# Patient Record
Sex: FEMALE | Race: BLACK OR AFRICAN AMERICAN | Employment: OTHER | URBAN - METROPOLITAN AREA
[De-identification: names, ages, dates, MRNs, and addresses within clinical notes are randomized per-mention and may not be internally consistent; named-entity substitution may affect disease eponyms.]

---

## 2022-11-16 ENCOUNTER — APPOINTMENT (OUTPATIENT)
Dept: GENERAL RADIOLOGY | Age: 72
DRG: 392 | End: 2022-11-16
Attending: EMERGENCY MEDICINE
Payer: MEDICARE

## 2022-11-16 ENCOUNTER — HOSPITAL ENCOUNTER (INPATIENT)
Age: 72
LOS: 1 days | Discharge: HOME | DRG: 392 | End: 2022-11-17
Attending: EMERGENCY MEDICINE | Admitting: INTERNAL MEDICINE
Payer: MEDICARE

## 2022-11-16 ENCOUNTER — APPOINTMENT (OUTPATIENT)
Dept: CT IMAGING | Age: 72
DRG: 392 | End: 2022-11-16
Attending: EMERGENCY MEDICINE
Payer: MEDICARE

## 2022-11-16 DIAGNOSIS — R65.10 SIRS (SYSTEMIC INFLAMMATORY RESPONSE SYNDROME) (HCC): ICD-10-CM

## 2022-11-16 DIAGNOSIS — R11.10 VOMITING AND DIARRHEA: ICD-10-CM

## 2022-11-16 DIAGNOSIS — T68.XXXA HYPOTHERMIA, INITIAL ENCOUNTER: Primary | ICD-10-CM

## 2022-11-16 DIAGNOSIS — R53.1 GENERALIZED WEAKNESS: ICD-10-CM

## 2022-11-16 DIAGNOSIS — R19.7 VOMITING AND DIARRHEA: ICD-10-CM

## 2022-11-16 PROBLEM — R11.2 NAUSEA & VOMITING: Status: ACTIVE | Noted: 2022-11-16

## 2022-11-16 LAB
ALBUMIN SERPL-MCNC: 3.7 G/DL (ref 3.5–5)
ALBUMIN/GLOB SERPL: 0.8 {RATIO} (ref 1.1–2.2)
ALP SERPL-CCNC: 80 U/L (ref 45–117)
ALT SERPL-CCNC: 28 U/L (ref 12–78)
ANION GAP SERPL CALC-SCNC: 7 MMOL/L (ref 5–15)
APPEARANCE UR: CLEAR
AST SERPL-CCNC: 21 U/L (ref 15–37)
BACTERIA URNS QL MICRO: NEGATIVE /HPF
BASE DEFICIT BLD-SCNC: 2.2 MMOL/L
BASOPHILS # BLD: 0 K/UL (ref 0–0.1)
BASOPHILS NFR BLD: 0 % (ref 0–1)
BILIRUB SERPL-MCNC: 0.2 MG/DL (ref 0.2–1)
BILIRUB UR QL: NEGATIVE
BUN SERPL-MCNC: 19 MG/DL (ref 6–20)
BUN/CREAT SERPL: 18 (ref 12–20)
CA-I BLD-MCNC: 1.24 MMOL/L (ref 1.12–1.32)
CALCIUM SERPL-MCNC: 9.7 MG/DL (ref 8.5–10.1)
CHLORIDE BLD-SCNC: 111 MMOL/L (ref 100–108)
CHLORIDE SERPL-SCNC: 110 MMOL/L (ref 97–108)
CO2 BLD-SCNC: 25 MMOL/L (ref 19–24)
CO2 SERPL-SCNC: 24 MMOL/L (ref 21–32)
COLOR UR: NORMAL
COVID-19 RAPID TEST, COVR: NOT DETECTED
CREAT SERPL-MCNC: 1.03 MG/DL (ref 0.55–1.02)
CREAT UR-MCNC: 0.8 MG/DL (ref 0.6–1.3)
DIFFERENTIAL METHOD BLD: ABNORMAL
EOSINOPHIL # BLD: 0 K/UL (ref 0–0.4)
EOSINOPHIL NFR BLD: 0 % (ref 0–7)
EPITH CASTS URNS QL MICRO: NORMAL /LPF
ERYTHROCYTE [DISTWIDTH] IN BLOOD BY AUTOMATED COUNT: 13.1 % (ref 11.5–14.5)
GLOBULIN SER CALC-MCNC: 4.6 G/DL (ref 2–4)
GLUCOSE BLD STRIP.AUTO-MCNC: 170 MG/DL (ref 74–106)
GLUCOSE SERPL-MCNC: 162 MG/DL (ref 65–100)
GLUCOSE UR STRIP.AUTO-MCNC: NEGATIVE MG/DL
HCO3 BLDA-SCNC: 25 MMOL/L
HCT VFR BLD AUTO: 43.9 % (ref 35–47)
HGB BLD-MCNC: 13.9 G/DL (ref 11.5–16)
HGB UR QL STRIP: NEGATIVE
HYALINE CASTS URNS QL MICRO: NORMAL /LPF (ref 0–2)
IMM GRANULOCYTES # BLD AUTO: 0.1 K/UL (ref 0–0.04)
IMM GRANULOCYTES NFR BLD AUTO: 1 % (ref 0–0.5)
KETONES UR QL STRIP.AUTO: NEGATIVE MG/DL
LACTATE BLD-SCNC: 2.1 MMOL/L (ref 0.4–2)
LEUKOCYTE ESTERASE UR QL STRIP.AUTO: NEGATIVE
LIPASE SERPL-CCNC: 87 U/L (ref 73–393)
LYMPHOCYTES # BLD: 1 K/UL (ref 0.8–3.5)
LYMPHOCYTES NFR BLD: 9 % (ref 12–49)
MAGNESIUM SERPL-MCNC: 2 MG/DL (ref 1.6–2.4)
MCH RBC QN AUTO: 30.8 PG (ref 26–34)
MCHC RBC AUTO-ENTMCNC: 31.7 G/DL (ref 30–36.5)
MCV RBC AUTO: 97.1 FL (ref 80–99)
MONOCYTES # BLD: 0.5 K/UL (ref 0–1)
MONOCYTES NFR BLD: 4 % (ref 5–13)
NEUTS SEG # BLD: 9.6 K/UL (ref 1.8–8)
NEUTS SEG NFR BLD: 86 % (ref 32–75)
NITRITE UR QL STRIP.AUTO: NEGATIVE
NRBC # BLD: 0 K/UL (ref 0–0.01)
NRBC BLD-RTO: 0 PER 100 WBC
PCO2 BLDV: 51.8 MMHG (ref 41–51)
PH BLDV: 7.3 [PH] (ref 7.32–7.42)
PH UR STRIP: 6 [PH] (ref 5–8)
PLATELET # BLD AUTO: 206 K/UL (ref 150–400)
PMV BLD AUTO: 12.3 FL (ref 8.9–12.9)
PO2 BLDV: 29 MMHG (ref 25–40)
POTASSIUM BLD-SCNC: 3.9 MMOL/L (ref 3.5–5.5)
POTASSIUM SERPL-SCNC: 3.8 MMOL/L (ref 3.5–5.1)
PROCALCITONIN SERPL-MCNC: <0.05 NG/ML
PROT SERPL-MCNC: 8.3 G/DL (ref 6.4–8.2)
PROT UR STRIP-MCNC: NEGATIVE MG/DL
RBC # BLD AUTO: 4.52 M/UL (ref 3.8–5.2)
RBC #/AREA URNS HPF: NORMAL /HPF (ref 0–5)
SERVICE CMNT-IMP: ABNORMAL
SODIUM BLD-SCNC: 146 MMOL/L (ref 136–145)
SODIUM SERPL-SCNC: 141 MMOL/L (ref 136–145)
SOURCE, COVRS: NORMAL
SP GR UR REFRACTOMETRY: 1.02 (ref 1–1.03)
SPECIMEN SITE: ABNORMAL
TROPONIN-HIGH SENSITIVITY: 6 NG/L (ref 0–51)
TSH SERPL DL<=0.05 MIU/L-ACNC: 1.31 UIU/ML (ref 0.36–3.74)
UA: UC IF INDICATED,UAUC: NORMAL
UROBILINOGEN UR QL STRIP.AUTO: 0.2 EU/DL (ref 0.2–1)
WBC # BLD AUTO: 11.3 K/UL (ref 3.6–11)
WBC URNS QL MICRO: NORMAL /HPF (ref 0–4)

## 2022-11-16 PROCEDURE — 87040 BLOOD CULTURE FOR BACTERIA: CPT

## 2022-11-16 PROCEDURE — 85025 COMPLETE CBC W/AUTO DIFF WBC: CPT

## 2022-11-16 PROCEDURE — 84484 ASSAY OF TROPONIN QUANT: CPT

## 2022-11-16 PROCEDURE — 93005 ELECTROCARDIOGRAM TRACING: CPT

## 2022-11-16 PROCEDURE — 74011000636 HC RX REV CODE- 636: Performed by: EMERGENCY MEDICINE

## 2022-11-16 PROCEDURE — 74011000258 HC RX REV CODE- 258: Performed by: INTERNAL MEDICINE

## 2022-11-16 PROCEDURE — 87635 SARS-COV-2 COVID-19 AMP PRB: CPT

## 2022-11-16 PROCEDURE — 82947 ASSAY GLUCOSE BLOOD QUANT: CPT

## 2022-11-16 PROCEDURE — 80053 COMPREHEN METABOLIC PANEL: CPT

## 2022-11-16 PROCEDURE — 74011250636 HC RX REV CODE- 250/636: Performed by: EMERGENCY MEDICINE

## 2022-11-16 PROCEDURE — 81001 URINALYSIS AUTO W/SCOPE: CPT

## 2022-11-16 PROCEDURE — 83735 ASSAY OF MAGNESIUM: CPT

## 2022-11-16 PROCEDURE — 71045 X-RAY EXAM CHEST 1 VIEW: CPT

## 2022-11-16 PROCEDURE — 99285 EMERGENCY DEPT VISIT HI MDM: CPT

## 2022-11-16 PROCEDURE — 83690 ASSAY OF LIPASE: CPT

## 2022-11-16 PROCEDURE — 84145 PROCALCITONIN (PCT): CPT

## 2022-11-16 PROCEDURE — 74011250636 HC RX REV CODE- 250/636: Performed by: INTERNAL MEDICINE

## 2022-11-16 PROCEDURE — 84443 ASSAY THYROID STIM HORMONE: CPT

## 2022-11-16 PROCEDURE — 36415 COLL VENOUS BLD VENIPUNCTURE: CPT

## 2022-11-16 PROCEDURE — 74177 CT ABD & PELVIS W/CONTRAST: CPT

## 2022-11-16 PROCEDURE — 96360 HYDRATION IV INFUSION INIT: CPT

## 2022-11-16 PROCEDURE — 65270000029 HC RM PRIVATE

## 2022-11-16 PROCEDURE — 74011000258 HC RX REV CODE- 258: Performed by: EMERGENCY MEDICINE

## 2022-11-16 PROCEDURE — 70450 CT HEAD/BRAIN W/O DYE: CPT

## 2022-11-16 RX ORDER — CLOPIDOGREL BISULFATE 75 MG/1
75 TABLET ORAL DAILY
Status: DISCONTINUED | OUTPATIENT
Start: 2022-11-17 | End: 2022-11-17 | Stop reason: HOSPADM

## 2022-11-16 RX ORDER — ONDANSETRON 4 MG/1
4 TABLET, ORALLY DISINTEGRATING ORAL
Status: DISCONTINUED | OUTPATIENT
Start: 2022-11-16 | End: 2022-11-17 | Stop reason: HOSPADM

## 2022-11-16 RX ORDER — ATORVASTATIN CALCIUM 10 MG/1
20 TABLET, FILM COATED ORAL DAILY
COMMUNITY

## 2022-11-16 RX ORDER — ONDANSETRON 2 MG/ML
4 INJECTION INTRAMUSCULAR; INTRAVENOUS
Status: DISCONTINUED | OUTPATIENT
Start: 2022-11-16 | End: 2022-11-17 | Stop reason: HOSPADM

## 2022-11-16 RX ORDER — ONDANSETRON 2 MG/ML
4 INJECTION INTRAMUSCULAR; INTRAVENOUS
Status: COMPLETED | OUTPATIENT
Start: 2022-11-16 | End: 2022-11-16

## 2022-11-16 RX ORDER — SODIUM CHLORIDE 0.9 % (FLUSH) 0.9 %
5-40 SYRINGE (ML) INJECTION EVERY 8 HOURS
Status: DISCONTINUED | OUTPATIENT
Start: 2022-11-16 | End: 2022-11-17 | Stop reason: HOSPADM

## 2022-11-16 RX ORDER — SODIUM CHLORIDE 0.9 % (FLUSH) 0.9 %
5-40 SYRINGE (ML) INJECTION AS NEEDED
Status: DISCONTINUED | OUTPATIENT
Start: 2022-11-16 | End: 2022-11-17 | Stop reason: HOSPADM

## 2022-11-16 RX ORDER — ACETAMINOPHEN 325 MG/1
650 TABLET ORAL
Status: DISCONTINUED | OUTPATIENT
Start: 2022-11-16 | End: 2022-11-17 | Stop reason: HOSPADM

## 2022-11-16 RX ORDER — ACETAMINOPHEN 650 MG/1
650 SUPPOSITORY RECTAL
Status: DISCONTINUED | OUTPATIENT
Start: 2022-11-16 | End: 2022-11-17 | Stop reason: HOSPADM

## 2022-11-16 RX ORDER — ENOXAPARIN SODIUM 100 MG/ML
40 INJECTION SUBCUTANEOUS DAILY
Status: DISCONTINUED | OUTPATIENT
Start: 2022-11-17 | End: 2022-11-17 | Stop reason: HOSPADM

## 2022-11-16 RX ORDER — POLYETHYLENE GLYCOL 3350 17 G/17G
17 POWDER, FOR SOLUTION ORAL DAILY PRN
Status: DISCONTINUED | OUTPATIENT
Start: 2022-11-16 | End: 2022-11-17 | Stop reason: HOSPADM

## 2022-11-16 RX ORDER — ATORVASTATIN CALCIUM 10 MG/1
20 TABLET, FILM COATED ORAL DAILY
Status: DISCONTINUED | OUTPATIENT
Start: 2022-11-17 | End: 2022-11-17 | Stop reason: HOSPADM

## 2022-11-16 RX ORDER — CLOPIDOGREL BISULFATE 75 MG/1
75 TABLET ORAL DAILY
COMMUNITY

## 2022-11-16 RX ADMIN — ONDANSETRON 4 MG: 2 INJECTION INTRAMUSCULAR; INTRAVENOUS at 22:49

## 2022-11-16 RX ADMIN — PIPERACILLIN AND TAZOBACTAM 3.38 G: 3; .375 INJECTION, POWDER, FOR SOLUTION INTRAVENOUS at 18:37

## 2022-11-16 RX ADMIN — IOPAMIDOL 100 ML: 755 INJECTION, SOLUTION INTRAVENOUS at 13:16

## 2022-11-16 RX ADMIN — SODIUM CHLORIDE 500 ML: 9 INJECTION, SOLUTION INTRAVENOUS at 12:05

## 2022-11-16 RX ADMIN — ONDANSETRON 4 MG: 2 INJECTION INTRAMUSCULAR; INTRAVENOUS at 16:14

## 2022-11-16 RX ADMIN — ONDANSETRON 4 MG: 2 INJECTION INTRAMUSCULAR; INTRAVENOUS at 11:58

## 2022-11-16 RX ADMIN — SODIUM CHLORIDE 1 G: 900 INJECTION INTRAVENOUS at 15:06

## 2022-11-16 NOTE — ED PROVIDER NOTES
EMERGENCY DEPARTMENT HISTORY AND PHYSICAL EXAM      Date: 11/16/2022  Patient Name: Vel Mendoza    History of Presenting Illness     Chief Complaint   Patient presents with    Vomiting     Pt lives with family after previous stroke 2 years ago, pt normally gets care at [place], pt has weakness at baseline but family notes she is particularly weak this morning after having one episode of diarrhea and vomiting, no blood in emesis or stool       History Provided By: Patient and Caregiver    HPI: Vel Mendoza, 71 y.o. female presents to the ED with cc of generalized weakness. Patient has history of a prior stroke, with residual right-sided weakness. She is normally seen at the [place]. Her family member states polina she normally can walk with a walker. She had 1 episode of vomiting diarrhea today, and since then, she has not been able to use her walker. Patient has had a cough for several days which is nonproductive. They deny fever or chills. Patient denies any pain, specifically denies chest pain, abdominal pain. She denies any change in urination. She is vaccinated against COVID-19, and has had a pneumonia vaccine. There are no other complaints, changes, or physical findings at this time. PCP: No primary care provider on file. No current facility-administered medications on file prior to encounter. No current outpatient medications on file prior to encounter. Past History     Past Medical History:  Past Medical History:   Diagnosis Date    CVA (cerebral vascular accident) [facility])        Past Surgical History:  No pertinent past surgical history    Family History:  No family history on file. Social History: Allergies: Allergies   Allergen Reactions    Aspirin Shortness of Breath         Review of Systems   Review of Systems   Constitutional:  Negative for fever. HENT:  Negative for congestion. Eyes: Negative. Respiratory:  Positive for cough.     Cardiovascular:  Negative for chest pain. Gastrointestinal:  Positive for diarrhea, nausea and vomiting. Negative for abdominal pain. Endocrine: Negative for heat intolerance. Genitourinary: Negative. Musculoskeletal:  Negative for back pain. Skin:  Negative for rash. Allergic/Immunologic: Negative for immunocompromised state. Neurological:  Positive for weakness. Hematological:  Does not bruise/bleed easily. Psychiatric/Behavioral: Negative. All other systems reviewed and are negative. Physical Exam   Physical Exam  Vitals and nursing note reviewed. Constitutional:       General: She is not in acute distress. Appearance: She is well-developed. HENT:      Head: Normocephalic. Cardiovascular:      Rate and Rhythm: Normal rate and regular rhythm. Pulses: Normal pulses. Heart sounds: Normal heart sounds. Pulmonary:      Effort: Pulmonary effort is normal.      Breath sounds: Normal breath sounds. Abdominal:      General: Bowel sounds are normal.      Palpations: Abdomen is soft. Tenderness: There is no abdominal tenderness. Musculoskeletal:         General: Normal range of motion. Cervical back: Normal range of motion and neck supple. Skin:     General: Skin is warm and dry. Neurological:      Mental Status: She is alert and oriented to person, place, and time. Comments: Mild baseline right-sided weakness, sensory intact, alert and oriented x3   Psychiatric:         Mood and Affect: Mood normal.         Behavior: Behavior normal.         Thought Content:  Thought content normal.       Diagnostic Study Results     Labs -     Recent Results (from the past 12 hour(s))   EKG, 12 LEAD, INITIAL    Collection Time: 11/16/22 11:08 AM   Result Value Ref Range    Ventricular Rate 59 BPM    Atrial Rate 59 BPM    P-R Interval 150 ms    QRS Duration 70 ms    Q-T Interval 470 ms    QTC Calculation (Bezet) 465 ms    Calculated R Axis -10 degrees    Calculated T Axis 132 degrees    Diagnosis Sinus bradycardia  Nonspecific ST and T wave abnormality  No previous ECGs available     TROPONIN-HIGH SENSITIVITY    Collection Time: 11/16/22 11:39 AM   Result Value Ref Range    Troponin-High Sensitivity 6 0 - 51 ng/L   CBC WITH AUTOMATED DIFF    Collection Time: 11/16/22 11:40 AM   Result Value Ref Range    WBC 11.3 (H) 3.6 - 11.0 K/uL    RBC 4.52 3.80 - 5.20 M/uL    HGB 13.9 11.5 - 16.0 g/dL    HCT 43.9 35.0 - 47.0 %    MCV 97.1 80.0 - 99.0 FL    MCH 30.8 26.0 - 34.0 PG    MCHC 31.7 30.0 - 36.5 g/dL    RDW 13.1 11.5 - 14.5 %    PLATELET 287 754 - 143 K/uL    MPV 12.3 8.9 - 12.9 FL    NRBC 0.0 0  WBC    ABSOLUTE NRBC 0.00 0.00 - 0.01 K/uL    NEUTROPHILS 86 (H) 32 - 75 %    LYMPHOCYTES 9 (L) 12 - 49 %    MONOCYTES 4 (L) 5 - 13 %    EOSINOPHILS 0 0 - 7 %    BASOPHILS 0 0 - 1 %    IMMATURE GRANULOCYTES 1 (H) 0.0 - 0.5 %    ABS. NEUTROPHILS 9.6 (H) 1.8 - 8.0 K/UL    ABS. LYMPHOCYTES 1.0 0.8 - 3.5 K/UL    ABS. MONOCYTES 0.5 0.0 - 1.0 K/UL    ABS. EOSINOPHILS 0.0 0.0 - 0.4 K/UL    ABS. BASOPHILS 0.0 0.0 - 0.1 K/UL    ABS. IMM. GRANS. 0.1 (H) 0.00 - 0.04 K/UL    DF AUTOMATED     METABOLIC PANEL, COMPREHENSIVE    Collection Time: 11/16/22 11:40 AM   Result Value Ref Range    Sodium 141 136 - 145 mmol/L    Potassium 3.8 3.5 - 5.1 mmol/L    Chloride 110 (H) 97 - 108 mmol/L    CO2 24 21 - 32 mmol/L    Anion gap 7 5 - 15 mmol/L    Glucose 162 (H) 65 - 100 mg/dL    BUN 19 6 - 20 MG/DL    Creatinine 1.03 (H) 0.55 - 1.02 MG/DL    BUN/Creatinine ratio 18 12 - 20      eGFR 58 (L) >60 ml/min/1.73m2    Calcium 9.7 8.5 - 10.1 MG/DL    Bilirubin, total 0.2 0.2 - 1.0 MG/DL    ALT (SGPT) 28 12 - 78 U/L    AST (SGOT) 21 15 - 37 U/L    Alk.  phosphatase 80 45 - 117 U/L    Protein, total 8.3 (H) 6.4 - 8.2 g/dL    Albumin 3.7 3.5 - 5.0 g/dL    Globulin 4.6 (H) 2.0 - 4.0 g/dL    A-G Ratio 0.8 (L) 1.1 - 2.2     LIPASE    Collection Time: 11/16/22 11:40 AM   Result Value Ref Range    Lipase 87 73 - 393 U/L   MAGNESIUM    Collection Time: 11/16/22 11:40 AM   Result Value Ref Range    Magnesium 2.0 1.6 - 2.4 mg/dL   BLOOD GAS,CHEM8,LACTIC ACID POC    Collection Time: 11/16/22 11:57 AM   Result Value Ref Range    Calcium, ionized (POC) 1.24 1.12 - 1.32 mmol/L    BICARBONATE 25 mmol/L    Base deficit (POC) 2.2 mmol/L    Sample source VENOUS BLOOD      CO2, POC 25 (H) 19 - 24 MMOL/L    Sodium,  (H) 136 - 145 MMOL/L    Potassium, POC 3.9 3.5 - 5.5 MMOL/L    Chloride,  (H) 100 - 108 MMOL/L    Glucose,  (H) 74 - 106 MG/DL    Creatinine, POC 0.8 0.6 - 1.3 MG/DL    Lactic Acid (POC) 2.10 (HH) 0.40 - 2.00 mmol/L    Critical value read back SMITH     pH, venous (POC) 7.30 (L) 7.32 - 7.42      pCO2, venous (POC) 51.8 (H) 41 - 51 MMHG    pO2, venous (POC) 29 25 - 40 mmHg   COVID-19 RAPID TEST    Collection Time: 11/16/22  2:24 PM   Result Value Ref Range    Specimen source Nasopharyngeal      COVID-19 rapid test Not detected NOTD     URINALYSIS W/ REFLEX CULTURE    Collection Time: 11/16/22  3:01 PM    Specimen: Urine   Result Value Ref Range    Color YELLOW/STRAW      Appearance CLEAR CLEAR      Specific gravity 1.020 1.003 - 1.030      pH (UA) 6.0 5.0 - 8.0      Protein Negative NEG mg/dL    Glucose Negative NEG mg/dL    Ketone Negative NEG mg/dL    Bilirubin Negative NEG      Blood Negative NEG      Urobilinogen 0.2 0.2 - 1.0 EU/dL    Nitrites Negative NEG      Leukocyte Esterase Negative NEG      UA:UC IF INDICATED CULTURE NOT INDICATED BY UA RESULT CNI      WBC 0-4 0 - 4 /hpf    RBC 0-5 0 - 5 /hpf    Epithelial cells FEW FEW /lpf    Bacteria Negative NEG /hpf    Hyaline cast 0-2 0 - 2 /lpf       Radiologic Studies -   CT HEAD WO CONT   Final Result   No acute intracranial process. Small scattered chronic infarcts. Imaging findings consistent with moderate chronic microvascular ischemic change. There is a outer it degree of cerebral atrophy.              CT ABD PELV W CONT   Final Result   No acute findings or findings to correlate with diarrhea and   vomiting. Probable postcholecystectomy related mild biliary ductal dilation. XR CHEST SNGL V   Final Result   Normal chest.        CT Results  (Last 48 hours)      None          CXR Results  (Last 48 hours)      None            Medical Decision Making   I am the first provider for this patient. I reviewed the vital signs, available nursing notes, past medical history, past surgical history, family history and social history. Vital Signs-Reviewed the patient's vital signs. Patient Vitals for the past 12 hrs:   Temp Pulse Resp BP SpO2   11/16/22 1520 96.9 °F (36.1 °C) 73 22 -- 96 %   11/16/22 1456 -- 68 19 132/65 96 %   11/16/22 1426 (!) 94.9 °F (34.9 °C) -- -- -- --   11/16/22 1224 (!) 94.4 °F (34.7 °C) 62 18 133/66 95 %   11/16/22 1054 (!) 92 °F (33.3 °C) 62 18 (!) 144/69 100 %       EKG interpretation: (Preliminary)  Rhythm: sinus bradycardia; and regular . Rate (approx.): 59; Axis: normal; NC interval: normal; QRS interval: normal ; ST/T wave: non-specific changes; Other findings: .    Records Reviewed: Nursing Notes and Old Medical Records    Provider Notes (Medical Decision Making):   Hypoglycemia, electrolyte abnormality, gastroenteritis, dehydration, pneumonia, bronchitis    ED Course:   Initial assessment performed. The patients presenting problems have been discussed, and they are in agreement with the care plan formulated and outlined with them. I have encouraged them to ask questions as they arise throughout their visit. ED [facility] ED SEPSIS NOTE:     12:00 PM The patient now meets criteria for: Severe Sepsis    Fluid resuscitation with: Patient does not require a 30cc/kg bolus because they do not meet criteria for septic shock (SBP<90 or decreased by >40 mmHG from baseline, MAP<65, Lactate 4 or greater). Due to concern for rapidly advancing infection and deterioration of patient's condition, antibiotics are started STAT and cultures ordered.         Consult note:      The patient is being admitted by Dr. Bee Desai, hospitalist        Critical Care Time:   CRITICAL CARE NOTE :    11:21 AM    IMPENDING DETERIORATION -Respiratory, Cardiovascular, CNS, Metabolic, and Renal  ASSOCIATED RISK FACTORS - Hypotension, Hypoxia, Dysrhythmia, Metabolic changes, and Dehydration  MANAGEMENT- Bedside Assessment and Supervision of Care  INTERPRETATION -  Xrays, CT Scan, ECG, and Blood Pressure  INTERVENTIONS - hemodynamic mngmt and Metobolic interventions  CASE REVIEW - Hospitalist/Intensivist, Nursing, and Family  TREATMENT RESPONSE -Improved  PERFORMED BY - Self    NOTES   :  I have spent 45 minutes of critical care time involved in lab review, consultations with specialist, family decision- making, bedside attention and documentation. This time excludes time spent in any separate billed procedures. During this entire length of time I was immediately available to the patient . Andrzej Cordero MD      Disposition:  admit    DISCHARGE PLAN:  1. There are no discharge medications for this patient. 2.   Follow-up Information    None       3. Return to ED if worse     Diagnosis     Clinical Impression:   1. Hypothermia, initial encounter    2. Vomiting and diarrhea    3. Generalized weakness    4. SIRS (systemic inflammatory response syndrome) (HCC)        Attestations:    Andrzej Cordero MD        Please note that this dictation was completed with [employer], the computer voice recognition software. Quite often unanticipated grammatical, syntax, homophones, and other interpretive errors are inadvertently transcribed by the computer software. Please disregard these errors. Please excuse any errors that have escaped final proofreading. Thank you.

## 2022-11-16 NOTE — ED NOTES
Patient back from CT at this time. Amparo hugger reapplied and patient connected to monitor x 3. SR x 2. Call bell in reach. Family at bedside.

## 2022-11-16 NOTE — ED NOTES
Pt. Resting comfortably in bed at this time with call bell in reach and family at bedside. Pt. And family updated on plan of care. PT. With no complaints. Will continue to monitor.

## 2022-11-16 NOTE — H&P
Hospitalist Admission Note    NAME: Roldan Holcomb   :  1950   MRN:  [id number]     Date/Time:  2022 5:41 PM    Patient PCP: Héctor Fitzgerald MD  ______________________________________________________________________  Given the patient's current clinical presentation, I have a high level of concern for decompensation if discharged from the emergency department. Complex decision making was performed, which includes reviewing the patient's available past medical records, laboratory results, and x-ray films. My assessment of this patient's clinical condition and my plan of care is as follows. Assessment / Plan:  Nausea vomiting and diarrhea  Ambulatory dysfunction  Hypothermia  Mild leukocytosis  Lactic acidosis 2.1  Admit to telemetry, follow-up blood cultures, apply Amparo onofre  No source found  Status post ceftriaxone by ED physician, will continue with Zosyn  Get enteric bacterial panel, get procalcitonin  TSH  PT OT  History of CVA  Continue with Plavix and statin        Code Status: DNR  Surrogate Decision Maker: Has 2 sisters    DVT Prophylaxis: Lovenox  GI Prophylaxis: not indicated    Baseline: Ambulate with a walker      Subjective:   CHIEF COMPLAINT: Nausea vomiting and diarrhea    HISTORY OF PRESENT ILLNESS:     Roldan Holcomb is a 71 y.o.   female past medical history of CVA with sequela right-sided weakness who presents with sudden onset of nausea vomiting and diarrhea this morning. Patient was also complaining of weakness and family was not able to get her up. Patient has been in her usual state of health, family who was at bedside, reported that patient has been weaker lately.   Patient denies any chest pain, shortness of breath, admits to nonproductive cough   in the ED, patient was found to be hypothermic with temperature of 92 °F, blood pressure was within normal limit  UA was unremarkable, chest x-ray showed no acute abnormality  CT of the head without contrast showed old stroke and CT abdomen and pelvis did not show any acute pathology. We were asked to admit for work up and evaluation of the above problems. Past Medical History:   Diagnosis Date    CVA (cerebral vascular accident) ([facility] [address]         No past surgical history on file. Social History     Tobacco Use    Smoking status: Not on file    Smokeless tobacco: Not on file   Substance Use Topics    Alcohol use: Not on file        History reviewed. No pertinent family history. Allergies   Allergen Reactions    Aspirin Shortness of Breath        Prior to Admission medications    Medication Sig Start Date End Date Taking? Authorizing Provider   clopidogreL (Plavix) 75 mg tab Take 75 mg by mouth daily. Yes Provider, Historical   atorvastatin (LIPITOR) 10 mg tablet Take 20 mg by mouth daily. Yes Provider, Historical       REVIEW OF SYSTEMS:     I am not able to complete the review of systems because:    The patient is intubated and sedated    The patient has altered mental status due to his acute medical problems    The patient has baseline aphasia from prior stroke(s)    The patient has baseline dementia and is not reliable historian    The patient is in acute medical distress and unable to provide information           Total of 12 systems reviewed as follows:       POSITIVE= underlined text  Negative = text not underlined  General:  fever, chills, sweats, generalized weakness, weight loss/gain,      loss of appetite   Eyes:    blurred vision, eye pain, loss of vision, double vision  ENT:    rhinorrhea, pharyngitis   Respiratory:   cough, sputum production, SOB, RANKIN, wheezing, pleuritic pain   Cardiology:   chest pain, palpitations, orthopnea, PND, edema, syncope   Gastrointestinal:  abdominal pain , N/V, diarrhea, dysphagia, constipation, bleeding   Genitourinary:  frequency, urgency, dysuria, hematuria, incontinence   Muskuloskeletal :  arthralgia, myalgia, back pain  Hematology:  easy bruising, nose or gum bleeding, lymphadenopathy   Dermatological: rash, ulceration, pruritis, color change / jaundice  Endocrine:   hot flashes or polydipsia   Neurological:  headache, dizziness, confusion, focal weakness, paresthesia,     Speech difficulties, memory loss, gait difficulty  Psychological: Feelings of anxiety, depression, agitation    Objective:   VITALS:    Visit Vitals  BP (!) 143/72   Pulse 72   Temp 96.9 °F (36.1 °C)   Resp 17   Ht 4' 10\" (1.473 m)   Wt 61.2 kg (135 lb)   SpO2 96%   BMI 28.22 kg/m²       PHYSICAL EXAM:    General:    Alert, cooperative, no distress, appears stated age. HEENT: Atraumatic, anicteric sclerae, pink conjunctivae     No oral ulcers, mucosa moist, throat clear, dentition fair  Neck:  Supple, symmetrical,  thyroid: non tender  Lungs:   Clear to auscultation bilaterally. No Wheezing or Rhonchi. No rales. Chest wall:  No tenderness  No Accessory muscle use. Heart:   Regular  rhythm,  No  murmur   No edema  Abdomen:   Soft, non-tender. Not distended. Bowel sounds normal  Extremities: No cyanosis. No clubbing,      Skin turgor normal, Capillary refill normal, Radial dial pulse 2+  Skin:     Not pale. Not Jaundiced  No rashes   Psych:  Good insight. Not depressed. Not anxious or agitated. Neurologic: EOMs intact. No facial asymmetry. No aphasia or slurred speech. Symmetrical strength, Sensation grossly intact.  Alert and oriented X 4.     _______________________________________________________________________  Care Plan discussed with:    Comments   Patient x    Family  x    RN x    Care Manager                    Consultant:      _______________________________________________________________________  Expected  Disposition:   Home with Family    HH/PT/OT/RN    SNF/LTC    NAV    ________________________________________________________________________  TOTAL TIME:  72 Minutes    Critical Care Provided     Minutes non procedure based      Comments     Reviewed previous records >50% of visit spent in counseling and coordination of care  Discussion with patient and/or family and questions answered       ________________________________________________________________________  Signed: Homer Krause MD    Procedures: see electronic medical records for all procedures/Xrays and details which were not copied into this note but were reviewed prior to creation of Plan. LAB DATA REVIEWED:    Recent Results (from the past 24 hour(s))   EKG, 12 LEAD, INITIAL    Collection Time: 11/16/22 11:08 AM   Result Value Ref Range    Ventricular Rate 59 BPM    Atrial Rate 59 BPM    P-R Interval 150 ms    QRS Duration 70 ms    Q-T Interval 470 ms    QTC Calculation (Bezet) 465 ms    Calculated R Axis -10 degrees    Calculated T Axis 132 degrees    Diagnosis       Sinus bradycardia  Nonspecific ST and T wave abnormality  No previous ECGs available     TROPONIN-HIGH SENSITIVITY    Collection Time: 11/16/22 11:39 AM   Result Value Ref Range    Troponin-High Sensitivity 6 0 - 51 ng/L   CBC WITH AUTOMATED DIFF    Collection Time: 11/16/22 11:40 AM   Result Value Ref Range    WBC 11.3 (H) 3.6 - 11.0 K/uL    RBC 4.52 3.80 - 5.20 M/uL    HGB 13.9 11.5 - 16.0 g/dL    HCT 43.9 35.0 - 47.0 %    MCV 97.1 80.0 - 99.0 FL    MCH 30.8 26.0 - 34.0 PG    MCHC 31.7 30.0 - 36.5 g/dL    RDW 13.1 11.5 - 14.5 %    PLATELET 738 624 - 125 K/uL    MPV 12.3 8.9 - 12.9 FL    NRBC 0.0 0  WBC    ABSOLUTE NRBC 0.00 0.00 - 0.01 K/uL    NEUTROPHILS 86 (H) 32 - 75 %    LYMPHOCYTES 9 (L) 12 - 49 %    MONOCYTES 4 (L) 5 - 13 %    EOSINOPHILS 0 0 - 7 %    BASOPHILS 0 0 - 1 %    IMMATURE GRANULOCYTES 1 (H) 0.0 - 0.5 %    ABS. NEUTROPHILS 9.6 (H) 1.8 - 8.0 K/UL    ABS. LYMPHOCYTES 1.0 0.8 - 3.5 K/UL    ABS. MONOCYTES 0.5 0.0 - 1.0 K/UL    ABS. EOSINOPHILS 0.0 0.0 - 0.4 K/UL    ABS. BASOPHILS 0.0 0.0 - 0.1 K/UL    ABS. IMM.  GRANS. 0.1 (H) 0.00 - 0.04 K/UL    DF AUTOMATED     METABOLIC PANEL, COMPREHENSIVE    Collection Time: 11/16/22 11:40 AM   Result Value Ref Range    Sodium 141 136 - 145 mmol/L    Potassium 3.8 3.5 - 5.1 mmol/L    Chloride 110 (H) 97 - 108 mmol/L    CO2 24 21 - 32 mmol/L    Anion gap 7 5 - 15 mmol/L    Glucose 162 (H) 65 - 100 mg/dL    BUN 19 6 - 20 MG/DL    Creatinine 1.03 (H) 0.55 - 1.02 MG/DL    BUN/Creatinine ratio 18 12 - 20      eGFR 58 (L) >60 ml/min/1.73m2    Calcium 9.7 8.5 - 10.1 MG/DL    Bilirubin, total 0.2 0.2 - 1.0 MG/DL    ALT (SGPT) 28 12 - 78 U/L    AST (SGOT) 21 15 - 37 U/L    Alk.  phosphatase 80 45 - 117 U/L    Protein, total 8.3 (H) 6.4 - 8.2 g/dL    Albumin 3.7 3.5 - 5.0 g/dL    Globulin 4.6 (H) 2.0 - 4.0 g/dL    A-G Ratio 0.8 (L) 1.1 - 2.2     LIPASE    Collection Time: 11/16/22 11:40 AM   Result Value Ref Range    Lipase 87 73 - 393 U/L   MAGNESIUM    Collection Time: 11/16/22 11:40 AM   Result Value Ref Range    Magnesium 2.0 1.6 - 2.4 mg/dL   BLOOD GAS,CHEM8,LACTIC ACID POC    Collection Time: 11/16/22 11:57 AM   Result Value Ref Range    Calcium, ionized (POC) 1.24 1.12 - 1.32 mmol/L    BICARBONATE 25 mmol/L    Base deficit (POC) 2.2 mmol/L    Sample source VENOUS BLOOD      CO2, POC 25 (H) 19 - 24 MMOL/L    Sodium,  (H) 136 - 145 MMOL/L    Potassium, POC 3.9 3.5 - 5.5 MMOL/L    Chloride,  (H) 100 - 108 MMOL/L    Glucose,  (H) 74 - 106 MG/DL    Creatinine, POC 0.8 0.6 - 1.3 MG/DL    Lactic Acid (POC) 2.10 (HH) 0.40 - 2.00 mmol/L    Critical value read back SMITH     pH, venous (POC) 7.30 (L) 7.32 - 7.42      pCO2, venous (POC) 51.8 (H) 41 - 51 MMHG    pO2, venous (POC) 29 25 - 40 mmHg   COVID-19 RAPID TEST    Collection Time: 11/16/22  2:24 PM   Result Value Ref Range    Specimen source Nasopharyngeal      COVID-19 rapid test Not detected NOTD     URINALYSIS W/ REFLEX CULTURE    Collection Time: 11/16/22  3:01 PM    Specimen: Urine   Result Value Ref Range    Color YELLOW/STRAW      Appearance CLEAR CLEAR      Specific gravity 1.020 1.003 - 1.030      pH (UA) 6.0 5.0 - 8.0 Protein Negative NEG mg/dL    Glucose Negative NEG mg/dL    Ketone Negative NEG mg/dL    Bilirubin Negative NEG      Blood Negative NEG      Urobilinogen 0.2 0.2 - 1.0 EU/dL    Nitrites Negative NEG      Leukocyte Esterase Negative NEG      UA:UC IF INDICATED CULTURE NOT INDICATED BY UA RESULT CNI      WBC 0-4 0 - 4 /hpf    RBC 0-5 0 - 5 /hpf    Epithelial cells FEW FEW /lpf    Bacteria Negative NEG /hpf    Hyaline cast 0-2 0 - 2 /lpf

## 2022-11-16 NOTE — ED NOTES
Pt. Presents to ED today for complaints of weakness that has been increasing over the past few days. Patient also reports some nausea and vomiting today. Patient with a history of stroke. Patient sister at bedside and unsure of medications. Patient is a poor historian and is usually seen at the [place]. Reports that patient takes cholesterol medication and a blood thinner.

## 2022-11-17 VITALS
HEIGHT: 58 IN | BODY MASS INDEX: 28.34 KG/M2 | RESPIRATION RATE: 18 BRPM | DIASTOLIC BLOOD PRESSURE: 59 MMHG | HEART RATE: 65 BPM | WEIGHT: 135 LBS | SYSTOLIC BLOOD PRESSURE: 113 MMHG | OXYGEN SATURATION: 96 % | TEMPERATURE: 97.6 F

## 2022-11-17 LAB
ANION GAP SERPL CALC-SCNC: 8 MMOL/L (ref 5–15)
ATRIAL RATE: 59 BPM
BASOPHILS # BLD: 0 K/UL (ref 0–0.1)
BASOPHILS NFR BLD: 0 % (ref 0–1)
BUN SERPL-MCNC: 14 MG/DL (ref 6–20)
BUN/CREAT SERPL: 14 (ref 12–20)
CALCIUM SERPL-MCNC: 8.9 MG/DL (ref 8.5–10.1)
CALCULATED R AXIS, ECG10: -10 DEGREES
CALCULATED T AXIS, ECG11: 132 DEGREES
CHLORIDE SERPL-SCNC: 113 MMOL/L (ref 97–108)
CO2 SERPL-SCNC: 23 MMOL/L (ref 21–32)
CREAT SERPL-MCNC: 1.03 MG/DL (ref 0.55–1.02)
DIAGNOSIS, 93000: NORMAL
DIFFERENTIAL METHOD BLD: ABNORMAL
EOSINOPHIL # BLD: 0 K/UL (ref 0–0.4)
EOSINOPHIL NFR BLD: 0 % (ref 0–7)
ERYTHROCYTE [DISTWIDTH] IN BLOOD BY AUTOMATED COUNT: 13 % (ref 11.5–14.5)
GLUCOSE SERPL-MCNC: 92 MG/DL (ref 65–100)
HCT VFR BLD AUTO: 41.5 % (ref 35–47)
HGB BLD-MCNC: 13.4 G/DL (ref 11.5–16)
IMM GRANULOCYTES # BLD AUTO: 0 K/UL (ref 0–0.04)
IMM GRANULOCYTES NFR BLD AUTO: 0 % (ref 0–0.5)
LACTATE BLD-SCNC: 1.39 MMOL/L (ref 0.4–2)
LYMPHOCYTES # BLD: 0.9 K/UL (ref 0.8–3.5)
LYMPHOCYTES NFR BLD: 10 % (ref 12–49)
MCH RBC QN AUTO: 31.3 PG (ref 26–34)
MCHC RBC AUTO-ENTMCNC: 32.3 G/DL (ref 30–36.5)
MCV RBC AUTO: 97 FL (ref 80–99)
MONOCYTES # BLD: 0.7 K/UL (ref 0–1)
MONOCYTES NFR BLD: 8 % (ref 5–13)
NEUTS SEG # BLD: 7.1 K/UL (ref 1.8–8)
NEUTS SEG NFR BLD: 82 % (ref 32–75)
NRBC # BLD: 0 K/UL (ref 0–0.01)
NRBC BLD-RTO: 0 PER 100 WBC
P-R INTERVAL, ECG05: 150 MS
PLATELET # BLD AUTO: 161 K/UL (ref 150–400)
PMV BLD AUTO: 12.4 FL (ref 8.9–12.9)
POTASSIUM SERPL-SCNC: 4.3 MMOL/L (ref 3.5–5.1)
Q-T INTERVAL, ECG07: 470 MS
QRS DURATION, ECG06: 70 MS
QTC CALCULATION (BEZET), ECG08: 465 MS
RBC # BLD AUTO: 4.28 M/UL (ref 3.8–5.2)
SODIUM SERPL-SCNC: 144 MMOL/L (ref 136–145)
VENTRICULAR RATE, ECG03: 59 BPM
WBC # BLD AUTO: 8.8 K/UL (ref 3.6–11)

## 2022-11-17 PROCEDURE — 74011250636 HC RX REV CODE- 250/636: Performed by: INTERNAL MEDICINE

## 2022-11-17 PROCEDURE — 36415 COLL VENOUS BLD VENIPUNCTURE: CPT

## 2022-11-17 PROCEDURE — 97535 SELF CARE MNGMENT TRAINING: CPT | Performed by: OCCUPATIONAL THERAPIST

## 2022-11-17 PROCEDURE — 85025 COMPLETE CBC W/AUTO DIFF WBC: CPT

## 2022-11-17 PROCEDURE — 97165 OT EVAL LOW COMPLEX 30 MIN: CPT | Performed by: OCCUPATIONAL THERAPIST

## 2022-11-17 PROCEDURE — 74011000250 HC RX REV CODE- 250: Performed by: INTERNAL MEDICINE

## 2022-11-17 PROCEDURE — 83605 ASSAY OF LACTIC ACID: CPT

## 2022-11-17 PROCEDURE — 80048 BASIC METABOLIC PNL TOTAL CA: CPT

## 2022-11-17 PROCEDURE — 97116 GAIT TRAINING THERAPY: CPT

## 2022-11-17 PROCEDURE — 74011250637 HC RX REV CODE- 250/637: Performed by: INTERNAL MEDICINE

## 2022-11-17 PROCEDURE — 97161 PT EVAL LOW COMPLEX 20 MIN: CPT

## 2022-11-17 PROCEDURE — 74011000258 HC RX REV CODE- 258: Performed by: INTERNAL MEDICINE

## 2022-11-17 RX ORDER — AMOXICILLIN AND CLAVULANATE POTASSIUM 875; 125 MG/1; MG/1
1 TABLET, FILM COATED ORAL EVERY 12 HOURS
Qty: 12 TABLET | Refills: 0 | Status: SHIPPED | OUTPATIENT
Start: 2022-11-17 | End: 2022-11-23

## 2022-11-17 RX ADMIN — PIPERACILLIN AND TAZOBACTAM 3.38 G: 3; .375 INJECTION, POWDER, FOR SOLUTION INTRAVENOUS at 08:15

## 2022-11-17 RX ADMIN — ENOXAPARIN SODIUM 40 MG: 100 INJECTION SUBCUTANEOUS at 08:15

## 2022-11-17 RX ADMIN — ONDANSETRON 4 MG: 2 INJECTION INTRAMUSCULAR; INTRAVENOUS at 04:46

## 2022-11-17 RX ADMIN — CLOPIDOGREL BISULFATE 75 MG: 75 TABLET ORAL at 08:15

## 2022-11-17 RX ADMIN — ATORVASTATIN CALCIUM 20 MG: 10 TABLET, FILM COATED ORAL at 08:15

## 2022-11-17 RX ADMIN — SODIUM CHLORIDE, PRESERVATIVE FREE 10 ML: 5 INJECTION INTRAVENOUS at 00:17

## 2022-11-17 RX ADMIN — PIPERACILLIN AND TAZOBACTAM 3.38 G: 3; .375 INJECTION, POWDER, FOR SOLUTION INTRAVENOUS at 00:11

## 2022-11-17 NOTE — DISCHARGE SUMMARY
Hospitalist Discharge Summary     Patient ID:  Homero Zeng  [id number]  82 y.o.  1950 11/16/2022    PCP on record: Sade Schofield MD    Admit date: 11/16/2022  Discharge date and time: 11/17/2022    DISCHARGE DIAGNOSIS:  Nausea vomiting and diarrhea  Ambulatory dysfunction  Hypothermia  Mild leukocytosis  Lactic acidosis 2.1  History of CVA      CONSULTATIONS:  None    Excerpted HPI from H&P of Margot Rojas MD:  CHIEF COMPLAINT: Nausea vomiting and diarrhea     HISTORY OF PRESENT ILLNESS:     Homero Zeng is a 71 y.o.   female past medical history of CVA with sequela right-sided weakness who presents with sudden onset of nausea vomiting and diarrhea this morning. Patient was also complaining of weakness and family was not able to get her up. Patient has been in her usual state of health, family who was at bedside, reported that patient has been weaker lately. Patient denies any chest pain, shortness of breath, admits to nonproductive cough   in the ED, patient was found to be hypothermic with temperature of 92 °F, blood pressure was within normal limit  UA was unremarkable, chest x-ray showed no acute abnormality  CT of the head without contrast showed old stroke and CT abdomen and pelvis did not show any acute pathology. We were asked to admit for work up and evaluation of the above problems    ______________________________________________________________________  DISCHARGE SUMMARY/HOSPITAL COURSE:  for full details see H&P, daily progress notes, labs, consult notes.    Nausea vomiting and diarrhea  Ambulatory dysfunction  Hypothermia  Mild leukocytosis  Lactic acidosis 2.1   Blood Culture negative to date  No source found  Status post ceftriaxone by ED physician , s/p  Zosyn  Procalcitonin trending low, discontinue antibiotics  TSH wnl     History of CVA  Continue with Plavix and statin          _______________________________________________________________________  Patient seen and examined by me on discharge day. Pertinent Findings:  Gen:    Not in distress  Chest: Clear lungs  CVS:   Regular rhythm. No edema  Abd:  Soft, not distended, not tender  Neuro:  Alert,   _______________________________________________________________________  DISCHARGE MEDICATIONS:   Current Discharge Medication List        START taking these medications    Details   amoxicillin-clavulanate (Augmentin) 875-125 mg per tablet Take 1 Tablet by mouth every twelve (12) hours for 6 days. Qty: 12 Tablet, Refills: 0  Start date: 11/17/2022, End date: 11/23/2022           CONTINUE these medications which have NOT CHANGED    Details   clopidogreL (Plavix) 75 mg tab Take 75 mg by mouth daily. atorvastatin (LIPITOR) 10 mg tablet Take 20 mg by mouth daily. Patient Follow Up Instructions:    Activity: Activity as tolerated  Diet: Regular Diet  Wound Care: None needed    Follow-up Information       Follow up With Specialties Details Why Contact Info    Shy Villa MD Family Medicine   [address]  [phone]            ________________________________________________________________    Risk of deterioration: Moderate    Condition at Discharge:  Stable  __________________________________________________________________    Disposition  Home with family, no needs    ____________________________________________________________________    Code Status: Full Code  ___________________________________________________________________      Total time in minutes spent coordinating this discharge (includes going over instructions, follow-up, prescriptions, and preparing report for sign off to her PCP) :  >30 minutes    Signed:  Robina Merritt MD

## 2022-11-17 NOTE — PROGRESS NOTES
OCCUPATIONAL THERAPY EVALUATION/DISCHARGE  Patient: Abhijit Rojo (8 y.o. female)  Date: 11/17/2022  Primary Diagnosis: Nausea & vomiting [R11.2]  Weakness [R53.1]       Precautions:  Fall, DNR    ASSESSMENT  Based on the objective data described below, the patient presents with baseline ADLs and close to baseline mobility. Pt reports getting assist at baseline. She was able to don shoes seated with crossed leg technique with supervision and good balance from high seated surface (pt was seated on stretcher). Pt was able to perform toileting with supervision and mobilized with RW at a SBA level. She has a standard walker at home which she uses. Pt may benefit from rolling walker for home use as she has chronic balance deficits. Recommend return to home with family assist as prior and resumption of HHPT services. Current Level of Function (ADLs/self-care): SBA for mobility with RW, supervision to SBA ADLS    Functional Outcome Measure: The patient scored 70/100 on the barthel outcome measure which is indicative of minimal deficits with mobility and ADLS. Other factors to consider for discharge: none     PLAN :  Recommend with staff: assist with mobility and ADLs with RW    Recommendation for discharge: (in order for the patient to meet his/her long term goals)  No skilled occupational therapy/ follow up rehabilitation needs identified at this time. , recommend continued HHPT    This discharge recommendation:  Has not yet been discussed the attending provider and/or case management    IF patient discharges home will need the following DME: rolling walker       SUBJECTIVE:   Patient stated It depends on who you ask. I can do it myself but my sister does it for me. She is afraid I am going to fall.     OBJECTIVE DATA SUMMARY:   HISTORY:   Past Medical History:   Diagnosis Date    CVA (cerebral vascular accident) ([facility] [address]    No past surgical history on file.     Prior Level of Function/Environment/Context: ambulated with standard walker with supervision, getting HHPT, sister assists with bathing and dressing, pt doesn't perform IADLS  Expanded or extensive additional review of patient history:   Home Situation  Home Environment: Private residence  # Steps to Enter: 1  One/Two Story Residence: Two story, live on 1st floor  Living Alone: No (sister)  Support Systems: Other Family Member(s), Other (Comment) (sister)  Patient Expects to be Discharged to[de-identified] Home  Current DME Used/Available at Home: [place], Shower chair, Cane, straight  Tub or Shower Type: Shower    Hand dominance: Right    EXAMINATION OF PERFORMANCE DEFICITS:  Cognitive/Behavioral Status:  Neurologic State: Alert  Orientation Level: Oriented X4  Cognition: Follows commands  Perception: Appears intact  Perseveration: No perseveration noted  Safety/Judgement: Fall prevention;Home safety; Insight into deficits    Hearing: Auditory  Auditory Impairment: None    Vision/Perceptual:                           Acuity:  (corrected with glassses)    Corrective Lenses: Glasses    Range of Motion:    AROM: Generally decreased, functional  PROM: Generally decreased, functional                      Strength:    Strength: Generally decreased, functional                Coordination:  Coordination: Within functional limits  Fine Motor Skills-Upper: Left Intact; Right Intact    Gross Motor Skills-Upper: Left Intact; Right Intact    Tone & Sensation:    Tone: Normal  Sensation: Intact                      Balance:  Sitting: Intact  Standing: Impaired  Standing - Static: Constant support;Good (RW)  Standing - Dynamic : Constant support; Fair (RW)    Functional Mobility and Transfers for ADLs:  Bed Mobility:  Supine to Sit: Stand-by assistance  Sit to Supine: Stand-by assistance  Scooting: Stand-by assistance    Transfers:  Sit to Stand: Stand-by assistance  Stand to Sit: Stand-by assistance  Bed to Chair: Stand-by assistance (with RW)  Bathroom Mobility: Stand-by assistance (with RW)  Toilet Transfer : Stand-by assistance    ADL Assessment:  Feeding: Independent    Oral Facial Hygiene/Grooming: Independent    Bathing: Supervision         Upper Body Dressing: Independent    Lower Body Dressing: Supervision    Toileting: Supervision                ADL Intervention and task modifications:   Donned shoes seated edge of high stretcher with crossed leg technique with supervision    Cognitive Retraining  Safety/Judgement: Fall prevention;Home safety; Insight into deficits    Functional Measure:    Barthel Index:  Bathin  Bladder: 10  Bowels: 10  Groomin  Dressin  Feeding: 10  Mobility: 10  Stairs: 5  Toilet Use: 5  Transfer (Bed to Chair and Back): 10  Total: 70/100      The Barthel ADL Index: Guidelines  1. The index should be used as a record of what a patient does, not as a record of what a patient could do. 2. The main aim is to establish degree of independence from any help, physical or verbal, however minor and for whatever reason. 3. The need for supervision renders the patient not independent. 4. A patient's performance should be established using the best available evidence. Asking the patient, friends/relatives and nurses are the usual sources, but direct observation and common sense are also important. However direct testing is not needed. 5. Usually the patient's performance over the preceding 24-48 hours is important, but occasionally longer periods will be relevant. 6. Middle categories imply that the patient supplies over 50 per cent of the effort. 7. Use of aids to be independent is allowed. Score Interpretation (from [address] [address])    Independent   60-79 Minimally independent   40-59 Partially dependent   20-39 Very dependent   <20 Totally dependent     -Jonn Vela., Barthel, D.W. (1965). Functional evaluation: the Barthel Index. [address] [facility] ([address] (1997).  The Barthel activities of daily living index: self-reporting versus actual performance in the old (> or = 75 years). Journal of [address] [place] [address] 45(2), [address] [facility], J.J.ALESIA.F, Juliann Birmingham., Arianna Bray. (1999). Measuring the change in disability after inpatient rehabilitation; comparison of the responsiveness of the Barthel Index and Functional [place] Measure. Journal of Neurology, Neurosurgery, and Psychiatry, 66(4), 099-713. KALEY Torres, RE Gil, & Madalyn Rey MBRYON. (2004) Assessment of post-stroke quality of life in cost-effectiveness studies: The usefulness of the Barthel Index and the EuroQoL-5D. Quality of Life Research, 15, 374-12        Occupational Therapy Evaluation Charge Determination   History Examination Decision-Making   LOW Complexity : Brief history review  LOW Complexity : 1-3 performance deficits relating to physical, cognitive , or psychosocial skils that result in activity limitations and / or participation restrictions  LOW Complexity : No comorbidities that affect functional and no verbal or physical assistance needed to complete eval tasks       Based on the above components, the patient evaluation is determined to be of the following complexity level: LOW   Pain Ratin/10    Activity Tolerance:   Good    After treatment patient left in no apparent distress:    Supine in bed, Call bell within reach, and bilateral rails up on stretcher    COMMUNICATION/EDUCATION:   The patients plan of care was discussed with: Physical therapist, Registered nurse, and patient .      Thank you for this referral.  CHRISTIANO Davis/L  Time Calculation: 16 mins

## 2022-11-17 NOTE — ED NOTES
1930:Assumed care of pt at this time. This RN was told by previous RN that continuous repeat lactic acid levels were not needed as told by Physician. Pt resting comfortably in bed on monitor x3 with call bell in reach.  Pt notified to call out to this RN if she were to need anything

## 2022-11-17 NOTE — ED NOTES
Bedside shift change report given to Maycol Gunderson (oncoming nurse) by Queta Do (offgoing nurse). Report included the following information SBAR, Kardex, ED Summary, Intake/Output, MAR, and Recent Results.

## 2022-11-17 NOTE — PROGRESS NOTES
Problem: Mobility Impaired (Adult and Pediatric)  Goal: *Acute Goals and Plan of Care (Insert Text)  Description: FUNCTIONAL STATUS PRIOR TO ADMISSION: Pt is S/SBA for mobility with standard walker per her report, lives with her sister who assists pt. Pt reports HHPT 2x/week PTA. HOME SUPPORT PRIOR TO ADMISSION: The patient lived with her sister and required supervision/set-up/SBA for mobility. Physical Therapy Goals  Initiated 11/17/2022  1. Patient will move from supine to sit and sit to supine , scoot up and down, and roll side to side in bed with independence within 7 day(s). 2.  Patient will transfer from bed to chair and chair to bed with supervision/set-up using the least restrictive device within 7 day(s). 3.  Patient will perform sit to stand with supervision/set-up within 7 day(s). 4.  Patient will ambulate with stand-by assistance for 100 feet with the least restrictive device within 7 day(s). Outcome: Progressing Towards Goal     PHYSICAL THERAPY EVALUATION  Patient: Marisel Gann (4 y.o. female)  Date: 11/17/2022  Primary Diagnosis: Nausea & vomiting [R11.2]  Weakness [R53.1]       Precautions:   Fall, DNR      ASSESSMENT  Nurse clears pt for mobility. Based on the objective data described below, the patient presents with slight decline in functional mobility efforts due to current medical condition, but she is able to amb. With RW use (walker at baseline) today, stable vitals, no overt LOB, generally unsteady with cuing for safety required. .. most likely very close to if not her baseline. She will need continued assist at discharge for safety via sister and continued HHPT follow-up. Will continue to follow while here. Patient Vitals for the past 4 hrs:   Temp Pulse Resp BP SpO2   11/17/22 1214 97.6 °F (36.4 °C) 65 18 (!) 113/59 96 %   11/17/22 0944 -- 64 18 -- 98 %          Current Level of Function Impacting Discharge (mobility/balance): bed mob.  SBA ; transfers SBA ; gait CGA with RW use    Functional Outcome Measure: The patient scored Total Score: 14/28 on the Tinetti outcome measure which is indicative of high fall risk. Other factors to consider for discharge: pt lives with supportive sister that assists pt     Patient will benefit from skilled therapy intervention to address the above noted impairments. PLAN :  Recommendations and Planned Interventions: bed mobility training, transfer training, gait training, therapeutic exercises, patient and family training/education, and therapeutic activities      Frequency/Duration: Patient will be followed by physical therapy:  3 times a week to address goals. Recommendation for discharge: (in order for the patient to meet his/her long term goals)  Physical therapy at least 2 days/week in the home AND ensure assist and/or supervision for safety with mobility    This discharge recommendation:  Has been made in collaboration with the attending provider and/or case management    IF patient discharges home will need the following DME: rolling walker (pt reports standard walker only)         SUBJECTIVE:   Patient stated I do my dressing on my own, but my sister just helps me because she is afraid I might fall.     OBJECTIVE DATA SUMMARY:   HISTORY:    Past Medical History:   Diagnosis Date    CVA (cerebral vascular accident) ([facility] [address]    No past surgical history on file.     Personal factors and/or comorbidities impacting plan of care:     Home Situation  Home Environment: Private residence  # Steps to Enter: 1  One/Two Story Residence: Two story, live on 1st floor  Living Alone: No (sister)  Support Systems: Other Family Member(s), Other (Comment) (sister)  Patient Expects to be Discharged to[de-identified] Home  Current DME Used/Available at Home: [place], Shower chair, Cane, straight  Tub or Shower Type: Shower    EXAMINATION/PRESENTATION/DECISION MAKING:   Critical Behavior:  Neurologic State: Alert  Orientation Level: Oriented X4  Cognition: Follows commands  Safety/Judgement: Awareness of environment  Hearing: Auditory  Auditory Impairment: None  Skin:  Intact  Edema: none  Range Of Motion:  AROM: Generally decreased, functional           PROM: Generally decreased, functional           Strength:    Strength: Generally decreased, functional                    Tone & Sensation:   Tone: Normal              Sensation: Intact               Coordination:  Coordination: Within functional limits    Functional Mobility:  Bed Mobility:     Supine to Sit: Stand-by assistance  Sit to Supine: Stand-by assistance  Scooting: Stand-by assistance  Transfers:  Sit to Stand: Stand-by assistance  Stand to Sit: Stand-by assistance                       Balance:   Sitting: Intact  Standing: Impaired  Standing - Static: Constant support;Good (RW)  Standing - Dynamic : Constant support; Fair (RW)  Ambulation/Gait Training:  Distance (ft): 20 Feet (ft)  Assistive Device: Gait belt;Walker, rolling  Ambulation - Level of Assistance: Contact guard assistance     Gait Description (WDL): Exceptions to WDL  Gait Abnormalities: Decreased step clearance        Base of Support: Widened     Speed/Nolvia: Pace decreased (<100 feet/min)  Step Length: Left shortened;Right shortened        Interventions: Verbal cues; Tactile cues; Safety awareness training         Functional Measure:  Tinetti test:    Sitting Balance: 1  Arises: 1  Attempts to Rise: 2  Immediate Standing Balance: 1  Standing Balance: 1  Nudged: 1  Eyes Closed: 0  Turn 360 Degrees - Continuous/Discontinuous: 0  Turn 360 Degrees - Steady/Unsteady: 0  Sitting Down: 1  Balance Score: 8 Balance total score  Indication of Gait: 1  R Step Length/Height: 1  L Step Length/Height: 1  R Foot Clearance: 1  L Foot Clearance: 1  Step Symmetry: 0  Step Continuity: 0  Path: 1  Trunk: 0  Walking Time: 0  Gait Score: 6 Gait total score  Total Score: 14/28 Overall total score         Tinetti Tool Score Risk of Falls  <19 = High Fall Risk  19-24 = Moderate Fall Risk  25-28 = Low Fall Risk  Ashish HARLEY. Performance-Oriented Assessment of Mobility Problems in Elderly Patients. [address]; [id number]. (Scoring Description: PT Bulletin Feb. 10, 1993)    Older adults: Loxleynohemy Jiménez et al, 2009; n = 1000 [facility] elderly evaluated with ABC, CHELE, ADL, and IADL)  · Mean CHELE score for males aged 69-68 years = 26.21(3.40)  · Mean CHELE score for females age 69-68 years = 25.16(4.30)  · Mean CHELE score for males over 80 years = 23.29(6.02)  · Mean CHELE score for females over 80 years = 17.20(8.32)        Physical Therapy Evaluation Charge Determination   History Examination Presentation Decision-Making   MEDIUM  Complexity : 1-2 comorbidities / personal factors will impact the outcome/ POC  LOW Complexity : 1-2 Standardized tests and measures addressing body structure, function, activity limitation and / or participation in recreation  LOW Complexity : Stable, uncomplicated  Other outcome measures Tinetti  LOW       Based on the above components, the patient evaluation is determined to be of the following complexity level: LOW     Pain Rating:  No c/o pain    Activity Tolerance:   Good      After treatment patient left in no apparent distress:   Supine in bed, Call bell within reach, and stretcher railings up x 2 ; nurse notified     COMMUNICATION/EDUCATION:   The patients plan of care was discussed with: Occupational therapist and Registered nurse. Fall prevention education was provided and the patient/caregiver indicated understanding., Patient/family have participated as able in goal setting and plan of care. , and Patient/family agree to work toward stated goals and plan of care.     Thank you for this referral.  Brain Marti, PT, DPT   Time Calculation: 15 mins

## 2022-11-17 NOTE — DISCHARGE INSTRUCTIONS
DISCHARGE DIAGNOSIS:  Nausea vomiting and diarrhea  Ambulatory dysfunction  Hypothermia  Mild leukocytosis  Lactic acidosis 2.1  History of CVA      MEDICATIONS:  It is important that you take the medication exactly as they are prescribed. Keep your medication in the bottles provided by the pharmacist and keep a list of the medication names, dosages, and times to be taken in your wallet. Do not take other medications without consulting your doctor. Pain Management: per above medications    What to do at Home    Recommended diet:  Regular Diet    Recommended activity: Activity as tolerated    If you have questions regarding the hospital related prescriptions or hospital related issues please call [address] [place] [address] at . You can always direct your questions to your primary care doctor if you are unable to reach your hospital physician; your PCP works as an extension of your hospital doctor just like your hospital doctor is an extension of your PCP for your time at the hospital [facility], [facility]).     If you experience any of the following symptoms then please call your primary care physician or return to the emergency room if you cannot get hold of your doctor:  Fever, chills, nausea, vomiting, diarrhea, change in mentation, falling, bleeding, shortness of breath

## 2022-11-17 NOTE — PROGRESS NOTES
Discharge paperwork reviewed with patient and her brother in law. Neither have any questions at this time.

## 2022-11-19 NOTE — PROGRESS NOTES
Physician Progress Note      PATIENT:               Jemma Rodas  [facility] #:                  [id number]  :                       1950  ADMIT DATE:       2022 10:55 AM  DISCH DATE:        2022 2:06 PM  RESPONDING  PROVIDER #:        Gilson Traore MD          QUERY TEXT:    Patient admitted with diarrhea, vomiting. Documentation reflects sepsis (ED). If possible, please document in the progress notes and discharge summary if sepsis was: The medical record reflects the following:  Risk Factors: 72F  Clinical Indicators: wbc 11-8, neut 86, lac 2.10, procal 0.05, t 92-95  Treatment: nena joynerer, ceftriaxone by ED physician, Zosyn, enteric bacterial panel, get procalcitonin  Thanks,  Ishan Steen RN/CDI   Options provided:  -- sepsis confirmed after study  -- sepsis  treated and resolved  -- sepsis  ruled out after study  -- Other - I will add my own diagnosis  -- Disagree - Not applicable / Not valid  -- Disagree - Clinically unable to determine / Unknown  -- Refer to Clinical Documentation Reviewer    PROVIDER RESPONSE TEXT:    sepsis ruled out after study.     Query created by: Ryan Ford on 2022 12:02 PM      Electronically signed by:  Gilson Traore MD 2022 3:29 PM

## 2022-11-22 LAB
BACTERIA SPEC CULT: NORMAL
SERVICE CMNT-IMP: NORMAL